# Patient Record
Sex: MALE | Race: WHITE | NOT HISPANIC OR LATINO
[De-identification: names, ages, dates, MRNs, and addresses within clinical notes are randomized per-mention and may not be internally consistent; named-entity substitution may affect disease eponyms.]

---

## 2017-12-01 ENCOUNTER — APPOINTMENT (OUTPATIENT)
Dept: GASTROENTEROLOGY | Facility: CLINIC | Age: 62
End: 2017-12-01
Payer: COMMERCIAL

## 2017-12-01 VITALS — SYSTOLIC BLOOD PRESSURE: 143 MMHG | HEART RATE: 90 BPM | DIASTOLIC BLOOD PRESSURE: 89 MMHG | RESPIRATION RATE: 14 BRPM

## 2017-12-01 VITALS — WEIGHT: 160 LBS | BODY MASS INDEX: 25.11 KG/M2 | HEIGHT: 67 IN

## 2017-12-01 DIAGNOSIS — Z87.39 PERSONAL HISTORY OF OTHER DISEASES OF THE MUSCULOSKELETAL SYSTEM AND CONNECTIVE TISSUE: ICD-10-CM

## 2017-12-01 DIAGNOSIS — Z80.0 FAMILY HISTORY OF MALIGNANT NEOPLASM OF DIGESTIVE ORGANS: ICD-10-CM

## 2017-12-01 DIAGNOSIS — Z86.69 PERSONAL HISTORY OF OTHER DISEASES OF THE NERVOUS SYSTEM AND SENSE ORGANS: ICD-10-CM

## 2017-12-01 DIAGNOSIS — Z86.59 PERSONAL HISTORY OF OTHER MENTAL AND BEHAVIORAL DISORDERS: ICD-10-CM

## 2017-12-01 PROCEDURE — 82270 OCCULT BLOOD FECES: CPT

## 2017-12-01 PROCEDURE — 99203 OFFICE O/P NEW LOW 30 MIN: CPT

## 2017-12-01 RX ORDER — ASPIRIN 81 MG/1
81 TABLET, CHEWABLE ORAL
Refills: 0 | Status: ACTIVE | COMMUNITY

## 2017-12-01 RX ORDER — AZITHROMYCIN 250 MG/1
250 TABLET, FILM COATED ORAL
Qty: 6 | Refills: 0 | Status: DISCONTINUED | COMMUNITY
Start: 2017-07-28

## 2017-12-01 RX ORDER — IBUPROFEN 600 MG/1
600 TABLET, FILM COATED ORAL
Qty: 20 | Refills: 0 | Status: ACTIVE | COMMUNITY
Start: 2017-11-03

## 2017-12-01 RX ORDER — AMOXICILLIN 500 MG/1
500 TABLET, FILM COATED ORAL
Qty: 28 | Refills: 0 | Status: DISCONTINUED | COMMUNITY
Start: 2017-11-03

## 2017-12-01 RX ORDER — PREDNISONE 20 MG/1
20 TABLET ORAL
Qty: 15 | Refills: 0 | Status: DISCONTINUED | COMMUNITY
Start: 2017-08-01

## 2017-12-01 RX ORDER — TRAZODONE HYDROCHLORIDE 100 MG/1
100 TABLET ORAL
Qty: 90 | Refills: 0 | Status: ACTIVE | COMMUNITY
Start: 2017-03-10

## 2017-12-01 RX ORDER — PREDNISONE 5 MG/1
5 TABLET ORAL
Qty: 30 | Refills: 0 | Status: ACTIVE | COMMUNITY
Start: 2017-05-22

## 2017-12-01 RX ORDER — FOLIC ACID 1 MG/1
1 TABLET ORAL
Qty: 30 | Refills: 0 | Status: ACTIVE | COMMUNITY
Start: 2017-05-22

## 2017-12-01 RX ORDER — ASCORBIC ACID 500 MG
TABLET ORAL
Refills: 0 | Status: ACTIVE | COMMUNITY

## 2017-12-01 RX ORDER — ATORVASTATIN CALCIUM 20 MG/1
20 TABLET, FILM COATED ORAL
Qty: 90 | Refills: 0 | Status: ACTIVE | COMMUNITY
Start: 2017-03-10

## 2017-12-01 RX ORDER — AMOXICILLIN 500 MG/1
500 CAPSULE ORAL
Qty: 28 | Refills: 0 | Status: DISCONTINUED | COMMUNITY
Start: 2017-11-04

## 2017-12-01 RX ORDER — ALBUTEROL SULFATE 90 UG/1
108 (90 BASE) AEROSOL, METERED RESPIRATORY (INHALATION)
Qty: 9 | Refills: 0 | Status: DISCONTINUED | COMMUNITY
Start: 2017-08-01

## 2017-12-01 RX ORDER — METHOTREXATE 2.5 MG/1
2.5 TABLET ORAL
Qty: 16 | Refills: 0 | Status: ACTIVE | COMMUNITY
Start: 2017-06-26

## 2018-02-16 ENCOUNTER — OUTPATIENT (OUTPATIENT)
Dept: OUTPATIENT SERVICES | Facility: HOSPITAL | Age: 63
LOS: 1 days | Discharge: HOME | End: 2018-02-16
Payer: COMMERCIAL

## 2018-02-16 ENCOUNTER — APPOINTMENT (OUTPATIENT)
Dept: GASTROENTEROLOGY | Facility: GI CENTER | Age: 63
End: 2018-02-16
Payer: COMMERCIAL

## 2018-02-16 DIAGNOSIS — K64.8 OTHER HEMORRHOIDS: ICD-10-CM

## 2018-02-16 DIAGNOSIS — K64.4 RESIDUAL HEMORRHOIDAL SKIN TAGS: ICD-10-CM

## 2018-02-16 DIAGNOSIS — K57.30 DIVERTICULOSIS OF LARGE INTESTINE W/OUT PERFORATION OR ABSCESS W/OUT BLEEDING: ICD-10-CM

## 2018-02-16 DIAGNOSIS — Z12.11 ENCOUNTER FOR SCREENING FOR MALIGNANT NEOPLASM OF COLON: ICD-10-CM

## 2018-02-16 PROCEDURE — 45378 DIAGNOSTIC COLONOSCOPY: CPT

## 2018-02-16 PROCEDURE — G0121: CPT

## 2018-07-26 PROBLEM — Z80.0 FAMILY HISTORY OF COLON CANCER: Status: INACTIVE | Noted: 2017-12-01

## 2021-12-16 ENCOUNTER — APPOINTMENT (OUTPATIENT)
Dept: THORACIC SURGERY | Facility: CLINIC | Age: 66
End: 2021-12-16
Payer: COMMERCIAL

## 2021-12-16 VITALS
SYSTOLIC BLOOD PRESSURE: 151 MMHG | RESPIRATION RATE: 15 BRPM | HEIGHT: 67 IN | OXYGEN SATURATION: 95 % | BODY MASS INDEX: 25.58 KG/M2 | DIASTOLIC BLOOD PRESSURE: 82 MMHG | HEART RATE: 80 BPM | WEIGHT: 163 LBS | TEMPERATURE: 98.1 F

## 2021-12-16 DIAGNOSIS — Z60.2 PROBLEMS RELATED TO LIVING ALONE: ICD-10-CM

## 2021-12-16 DIAGNOSIS — Z72.89 OTHER PROBLEMS RELATED TO LIFESTYLE: ICD-10-CM

## 2021-12-16 PROCEDURE — 99204 OFFICE O/P NEW MOD 45 MIN: CPT

## 2021-12-16 RX ORDER — CHLORHEXIDINE GLUCONATE, 0.12% ORAL RINSE 1.2 MG/ML
0.12 SOLUTION DENTAL
Qty: 473 | Refills: 0 | Status: COMPLETED | COMMUNITY
Start: 2017-11-03 | End: 2021-12-16

## 2021-12-16 RX ORDER — POLYETHYLENE GLYOCOL 3350, SODIUM CHLORIDE, SODIUM BICARBONATE AND POTASSIUM CHLORIDE 420; 11.2; 5.72; 1.48 G/4L; G/4L; G/4L; G/4L
420 POWDER, FOR SOLUTION NASOGASTRIC; ORAL
Qty: 1 | Refills: 0 | Status: COMPLETED | COMMUNITY
Start: 2017-12-01 | End: 2021-12-16

## 2021-12-16 SDOH — SOCIAL STABILITY - SOCIAL INSECURITY: PROBLEMS RELATED TO LIVING ALONE: Z60.2

## 2021-12-16 NOTE — HISTORY OF PRESENT ILLNESS
[FreeTextEntry1] : Mr. FRYE is a 66 year old male with 50 pack year smoking history and past medical history of Asbestos, emphysema, exposure, Depression, Rheumatic Arthritis on Methotrexate , Hyperlipidemia, Right Lung consolidation/ Bullous changes . He is referred by pulmonologist  for initial evaluation and management for  Lung consolidation/ Bullous changes. Today he reports feeling well and offers no complaints. Denies any unintentional weight loss, chest pain, dizziness, palpitations, shortness of breath, fevers, chills, nausea, vomiting, diarrhea or other related symptoms. \par \par

## 2021-12-16 NOTE — ASSESSMENT
[FreeTextEntry1] : \par \par I had the pleasure of evaluating Mr. Albarran today during our office visit. Most recent CT scan revealed right lung consolidation/bullous changes. I have reviewed the patient's medical records and diagnostic images during the time of this office visit, and I have made the following recommendation. I am recommending a flexible bronchoscopy in order to r/o AFB, fungus. I have discussed the risks and benefits of the procedure in detail. He is in agreement to proceed. He will be scheduled in the next one to two weeks.\par \par \par PLAN:\par -Schedule Flexible Bronchoscopy \par -Requires medical clearance prior to bronchoscopy \par \par \par \par \par \par "I, Herber Morataya, am scribing for and the presence of Dr. Bishop the following sections HISTORY OF PRESENT ILLNESS, PAST MEDICAL/FAMILY/SOCIAL HISTORY; REVIEW OF SYSTEMS; VITAL SIGNS; PHYSICAL EXAM; DISPOSITION."\par \par "I personally performed the services described in the documentation, review the documentation recorded by the scribe in my presence and it accurately and completely records my words and actions."\par \par

## 2021-12-16 NOTE — CONSULT LETTER
[Dear  ___] : Dear  [unfilled], [Courtesy Letter:] : I had the pleasure of seeing your patient, [unfilled], in my office today. [Please see my note below.] : Please see my note below. [Consult Closing:] : Thank you very much for allowing me to participate in the care of this patient.  If you have any questions, please do not hesitate to contact me. [Sincerely,] : Sincerely, [FreeTextEntry2] :  [FreeTextEntry3] : Eduin Bishop MD\par Director of Thoracic, [facility]\par Cardiovascular & Thoracic Surgery\par [address] [place] [address] \[place] [address]\par Tel: [phone]\par Fax: [phone]\par \par

## 2021-12-16 NOTE — REVIEW OF SYSTEMS
[Feeling Poorly] : not feeling poorly [Feeling Tired] : not feeling tired [Eyesight Problems] : no eyesight problems [Discharge From Eyes] : no purulent discharge from the eyes [Nosebleeds] : no nosebleeds [Nasal Discharge] : no nasal discharge [Chest Pain] : no chest pain [Palpitations] : no palpitations [Cough] : cough [SOB on Exertion] : no shortness of breath during exertion [Constipation] : no constipation [Diarrhea] : no diarrhea [Hesitancy] : no urinary hesitancy [Nocturia] : no nocturia [Limb Pain] : no limb pain [Limb Swelling] : no limb swelling [Itching] : no itching [Change In A Mole] : no change in a mole [Anxiety] : no anxiety [Depression] : no depression [Muscle Weakness] : no muscle weakness [Erectile Dysfunction] : no erectile dysfunction [Swollen Glands] : no swollen glands [Swollen Glands In The Neck] : no swollen glands in the neck

## 2021-12-16 NOTE — DATA REVIEWED
[FreeTextEntry1] : 9/24/21 Non contrast CT chest revealed Moderate emphysematous changes. \par \par Subpleural areas of heterogeneous parenchymal density with mixed areas of consolidation and underlying bullous changes. One focal area of consolidative appearance and nodularity along the superior margin appears more prominent since the recent CT of 6/18/2021. Given the persistence of findings since the earlier examination of 12/4/2020, differential diagnosis includes mycobacterial or other fungal infections including aspergillosis. Underlying neoplastic process cannot be excluded. Clinical evaluation and follow-up is recommended. \par \par 5/1/21 PET scan revealed There is intense FDG activity corresponding with consolidative changes along the lateral periphery of the right lung apex. Findings may represent an infectious/inflammatory process. Malignancy is less likely although cannot be excluded. Continued follow-up with a dedicated, noncontrast CT scan of the chest in 3-6 months is recommended for further evaluation if biopsy is deferred at\par this time.There is intense FDG activity at the left wrist. MRI correlation is recommended for further evaluation.\par There are FDG avid left axillary lymph nodes which may be reactive in nature. Malignancy is less likely although cannot be excluded. \par

## 2021-12-16 NOTE — PHYSICAL EXAM
[General Appearance - Alert] : alert [General Appearance - Well Nourished] : well nourished [Sclera] : the sclera and conjunctiva were normal [Extraocular Movements] : extraocular movements were intact [Outer Ear] : the ears and nose were normal in appearance [Hearing Threshold Finger Rub Not [place]] : hearing was normal [Neck Appearance] : the appearance of the neck was normal [] : no respiratory distress [Exaggerated Use Of Accessory Muscles For Inspiration] : no accessory muscle use [Apical Impulse] : the apical impulse was normal [Examination Of The Chest] : the chest was normal in appearance [2+] : left 2+ [Bowel Sounds] : normal bowel sounds [FreeTextEntry1] : defer [Cervical Lymph Nodes Enlarged Posterior Bilaterally] : posterior cervical [No CVA Tenderness] : no ~M costovertebral angle tenderness [Abnormal Walk] : normal gait [Skin Color & Pigmentation] : normal skin color and pigmentation [Sensation] : the sensory exam was normal to light touch and pinprick [Oriented To Time, Place, And Person] : oriented to person, place, and time

## 2022-01-10 ENCOUNTER — OUTPATIENT (OUTPATIENT)
Dept: OUTPATIENT SERVICES | Facility: HOSPITAL | Age: 67
LOS: 1 days | End: 2022-01-10
Payer: COMMERCIAL

## 2022-01-10 VITALS
TEMPERATURE: 98 F | HEART RATE: 60 BPM | DIASTOLIC BLOOD PRESSURE: 60 MMHG | RESPIRATION RATE: 16 BRPM | HEIGHT: 67 IN | WEIGHT: 165.35 LBS | SYSTOLIC BLOOD PRESSURE: 110 MMHG

## 2022-01-10 DIAGNOSIS — Z87.39 PERSONAL HISTORY OF OTHER DISEASES OF THE MUSCULOSKELETAL SYSTEM AND CONNECTIVE TISSUE: ICD-10-CM

## 2022-01-10 DIAGNOSIS — Z29.9 ENCOUNTER FOR PROPHYLACTIC MEASURES, UNSPECIFIED: ICD-10-CM

## 2022-01-10 DIAGNOSIS — Z98.890 OTHER SPECIFIED POSTPROCEDURAL STATES: Chronic | ICD-10-CM

## 2022-01-10 DIAGNOSIS — R22.2 LOCALIZED SWELLING, MASS AND LUMP, TRUNK: ICD-10-CM

## 2022-01-10 DIAGNOSIS — Z01.818 ENCOUNTER FOR OTHER PREPROCEDURAL EXAMINATION: ICD-10-CM

## 2022-01-10 LAB
ANION GAP SERPL CALC-SCNC: 12 MMOL/L — SIGNIFICANT CHANGE UP (ref 5–17)
BUN SERPL-MCNC: 15.1 MG/DL — SIGNIFICANT CHANGE UP (ref 8–20)
CALCIUM SERPL-MCNC: 10 MG/DL — SIGNIFICANT CHANGE UP (ref 8.6–10.2)
CHLORIDE SERPL-SCNC: 104 MMOL/L — SIGNIFICANT CHANGE UP (ref 98–107)
CO2 SERPL-SCNC: 24 MMOL/L — SIGNIFICANT CHANGE UP (ref 22–29)
CREAT SERPL-MCNC: 0.96 MG/DL — SIGNIFICANT CHANGE UP (ref 0.5–1.3)
GLUCOSE SERPL-MCNC: 108 MG/DL — HIGH (ref 70–99)
HCT VFR BLD CALC: 48.7 % — SIGNIFICANT CHANGE UP (ref 39–50)
HGB BLD-MCNC: 16.3 G/DL — SIGNIFICANT CHANGE UP (ref 13–17)
MCHC RBC-ENTMCNC: 33.5 GM/DL — SIGNIFICANT CHANGE UP (ref 32–36)
MCHC RBC-ENTMCNC: 34 PG — SIGNIFICANT CHANGE UP (ref 27–34)
MCV RBC AUTO: 101.7 FL — HIGH (ref 80–100)
PLATELET # BLD AUTO: 280 K/UL — SIGNIFICANT CHANGE UP (ref 150–400)
POTASSIUM SERPL-MCNC: 4.9 MMOL/L — SIGNIFICANT CHANGE UP (ref 3.5–5.3)
POTASSIUM SERPL-SCNC: 4.9 MMOL/L — SIGNIFICANT CHANGE UP (ref 3.5–5.3)
RBC # BLD: 4.79 M/UL — SIGNIFICANT CHANGE UP (ref 4.2–5.8)
RBC # FLD: 13.2 % — SIGNIFICANT CHANGE UP (ref 10.3–14.5)
SODIUM SERPL-SCNC: 140 MMOL/L — SIGNIFICANT CHANGE UP (ref 135–145)
WBC # BLD: 7.48 K/UL — SIGNIFICANT CHANGE UP (ref 3.8–10.5)
WBC # FLD AUTO: 7.48 K/UL — SIGNIFICANT CHANGE UP (ref 3.8–10.5)

## 2022-01-10 PROCEDURE — 80048 BASIC METABOLIC PNL TOTAL CA: CPT

## 2022-01-10 PROCEDURE — 93010 ELECTROCARDIOGRAM REPORT: CPT

## 2022-01-10 PROCEDURE — 85027 COMPLETE CBC AUTOMATED: CPT

## 2022-01-10 PROCEDURE — 93005 ELECTROCARDIOGRAM TRACING: CPT

## 2022-01-10 PROCEDURE — G0463: CPT

## 2022-01-10 PROCEDURE — 36415 COLL VENOUS BLD VENIPUNCTURE: CPT

## 2022-01-10 RX ORDER — ASPIRIN/CALCIUM CARB/MAGNESIUM 324 MG
0 TABLET ORAL
Qty: 0 | Refills: 0 | DISCHARGE

## 2022-01-10 RX ORDER — METHOTREXATE 2.5 MG/1
6 TABLET ORAL
Qty: 0 | Refills: 0 | DISCHARGE

## 2022-01-10 RX ORDER — ATORVASTATIN CALCIUM 80 MG/1
1 TABLET, FILM COATED ORAL
Qty: 0 | Refills: 0 | DISCHARGE

## 2022-01-10 RX ORDER — TRAZODONE HCL 50 MG
1 TABLET ORAL
Qty: 0 | Refills: 0 | DISCHARGE

## 2022-01-10 RX ORDER — FOLIC ACID 0.8 MG
1 TABLET ORAL
Qty: 0 | Refills: 0 | DISCHARGE

## 2022-01-10 NOTE — H&P PST ADULT - PROBLEM SELECTOR PLAN 3
Flexible Bronchoscopy by Dr. Bishop on 1/19/22. Covid vaccine series completed, card in chart,(Pfizer X 3 10/29/21)  covid test is on 1/16/22. Medical Clearance pending

## 2022-01-10 NOTE — ASU PATIENT PROFILE, ADULT - MEDICATIONS BROUGHT TO HOSPITAL, PROFILE
Scott Tran arrives from lab  Labs reviewed and hg is 7.7   Platelet count is 119  No transfusion needed   Discharged per ambulatory
no

## 2022-01-10 NOTE — ASU PATIENT PROFILE, ADULT - FALL HARM RISK - UNIVERSAL INTERVENTIONS
Bed in lowest position, wheels locked, appropriate side rails in place/Call bell, personal items and telephone in reach/Instruct patient to call for assistance before getting out of bed or chair/Non-slip footwear when patient is out of bed/[place] to call system/Physically safe environment - no spills, clutter or unnecessary equipment/Purposeful Proactive Rounding/Room/bathroom lighting operational, light cord in reach

## 2022-01-10 NOTE — H&P PST ADULT - FALL HARM RISK - UNIVERSAL INTERVENTIONS
Bed in lowest position, wheels locked, appropriate side rails in place/Call bell, personal items and telephone in reach/Instruct patient to call for assistance before getting out of bed or chair/Non-slip footwear when patient is out of bed/[place] to call system/Physically safe environment - no spills, clutter or unnecessary equipment/Purposeful Proactive Rounding/Room/bathroom lighting operational, light cord in reach

## 2022-01-10 NOTE — H&P PST ADULT - ASSESSMENT
66 year old male who states that he has lung Nodules, now scheduled for a Flexible Bronchoscopy by Dr. Bishop on 1/19/22. Covid vaccine series completed, card in chart,(Pfizer X 3 10/29/21)  covid test is on 1/16/22. Medical Clearance pending  66 year old male who states that he has lung Nodules, now scheduled for a Flexible Bronchoscopy by Dr. Bishop on 22. Covid vaccine series completed, card in chart,(Pfizer X 3 10/29/21)  covid test is on 22. Medical Clearance pending     medications reviewed, instructions given on what medications to take and what not to take. Asked the patient to consult with PCPabout holding ASA  and stop/Hold it accordingly, failure to do so may result in cancellation or postponement of your surgery,pt  agreed and verbalized understanding. Stop Aleve and MVI one week prior. All other meds can be continued till the night before surgery. pt is not taking Plavix OR Anticoagulation medication at this time.     CAPRINI VTE 2.0 SCORE [CLOT updated 2019]    AGE RELATED RISK FACTORS                                                       MOBILITY RELATED FACTORS  [ ] Age 41-60 years                                            (1 Point)                    [ ] Bed rest                                                        (1 Point)  [x ] Age: 61-74 years                                           (2 Points)                  [ ] Plaster cast                                                   (2 Points)  [ ] Age= 75 years                                              (3 Points)                    [ ] Bed bound for more than 72 hours                 (2 Points)    DISEASE RELATED RISK FACTORS                                               GENDER SPECIFIC FACTORS  [ ] Edema in the lower extremities                       (1 Point)              [ ] Pregnancy                                                     (1 Point)  [ ] Varicose veins                                               (1 Point)                     [ ] Post-partum < 6 weeks                                   (1 Point)             [x ] BMI > 25 Kg/m2                                            (1 Point)                     [ ] Hormonal therapy  or oral contraception          (1 Point)                 [ ] Sepsis (in the previous month)                        (1 Point)               [ ] History of pregnancy complications                 (1 point)  [ ] Pneumonia or serious lung disease                                               [ ] Unexplained or recurrent                     (1 Point)           (in the previous month)                               (1 Point)  [ ] Abnormal pulmonary function test                     (1 Point)                 SURGERY RELATED RISK FACTORS  [ ] Acute myocardial infarction                              (1 Point)               [ ]  Section                                             (1 Point)  [ ] Congestive heart failure (in the previous month)  (1 Point)      [ x] Minor surgery                                                  (1 Point)   [ ] Inflammatory bowel disease                             (1 Point)               [ ] Arthroscopic surgery                                        (2 Points)  [ ] Central venous access                                      (2 Points)                [ ] General surgery lasting more than 45 minutes (2 points)  [ ] Malignancy- Present or previous                   (2 Points)                [ ] Elective arthroplasty                                         (5 points)    [ ] Stroke (in the previous month)                          (5 Points)                                                                                                                                                           HEMATOLOGY RELATED FACTORS                                                 TRAUMA RELATED RISK FACTORS  [ ] Prior episodes of VTE                                     (3 Points)                [ ] Fracture of the hip, pelvis, or leg                       (5 Points)  [ ] Positive family history for VTE                         (3 Points)             [ ] Acute spinal cord injury (in the previous month)  (5 Points)  [ ] Prothrombin 52925 A                                     (3 Points)               [ ] Paralysis  (less than 1 month)                             (5 Points)  [ ] Factor V Leiden                                             (3 Points)                  [ ] Multiple Trauma within 1 month                        (5 Points)  [ ] Lupus anticoagulants                                     (3 Points)                                                           [ ] Anticardiolipin antibodies                               (3 Points)                                                       [ ] High homocysteine in the blood                      (3 Points)                                             [ ] Other congenital or acquired thrombophilia      (3 Points)                                                [ ] Heparin induced thrombocytopenia                  (3 Points)                                     Total Score [     4     ]    OPIOID RISK TOOL    RODRIGO EACH BOX THAT APPLIES AND ADD TOTALS AT THE END    FAMILY HISTORY OF SUBSTANCE ABUSE                 FEMALE         MALE                                                Alcohol                             [  ]1 pt          [  ]3pts                                               Illegal Drugs                     [  ]2 pts        [  ]3pts                                               Rx Drugs                           [  ]4 pts        [  ]4 pts    PERSONAL HISTORY OF SUBSTANCE ABUSE                                                                                          Alcohol                             [  ]3 pts       [  ]3 pts                                               Illegal Drugs                     [  ]4 pts        [  ]4 pts                                               Rx Drugs                           [  ]5 pts        [  ]5 pts    AGE BETWEEN 16-45 YEARS                                      [  ]1 pt         [  ]1 pt    HISTORY OF PREADOLESCENT   SEXUAL ABUSE                                                             [  ]3 pts        [  ]0pts    PSYCHOLOGICAL DISEASE                     ADD, OCD, Bipolar, Schizophrenia        [  ]2 pts         [  ]2 pts                      Depression                                               [  ]1 pt           [  ]1 pt           SCORING TOTAL   (add numbers and type here)              ( 0)                                     A score of 3 or lower indicated LOW risk for future opioid abuse  A score of 4 to 7 indicated moderate risk for future opioid abuse  A score of 8 or higher indicates a high risk for opioid abuse

## 2022-01-10 NOTE — H&P PST ADULT - HISTORY OF PRESENT ILLNESS
66 year old male with 50 pack year smoking history and past medical history of Asbestos, emphysema, exposure, Depression, Rheumatic Arthritis on Methotrexate , Hyperlipidemia, Right Lung consolidation/ Bullous changes who is here PST states that he has lung Nodules, now scheduled for a Flexible Bronchoscopy by Dr. Bishop on 1/19/22. Covid vaccine series completed, card in chart,(Pfizer X 3 10/29/21)  covid test is on 1/16/22. Medical Clearance pending. Today he reports feeling well and offers no complaints. Denies any unintentional weight loss, chest pain, dizziness, palpitations, shortness of breath, fevers, chills, nausea, vomiting, diarrhea or other related symptoms.

## 2022-02-01 ENCOUNTER — TRANSCRIPTION ENCOUNTER (OUTPATIENT)
Age: 67
End: 2022-02-01

## 2022-02-02 ENCOUNTER — RESULT REVIEW (OUTPATIENT)
Age: 67
End: 2022-02-02

## 2022-02-02 ENCOUNTER — APPOINTMENT (OUTPATIENT)
Dept: THORACIC SURGERY | Facility: HOSPITAL | Age: 67
End: 2022-02-02

## 2022-02-02 ENCOUNTER — OUTPATIENT (OUTPATIENT)
Dept: OUTPATIENT SERVICES | Facility: HOSPITAL | Age: 67
LOS: 1 days | End: 2022-02-02
Payer: COMMERCIAL

## 2022-02-02 DIAGNOSIS — R22.2 LOCALIZED SWELLING, MASS AND LUMP, TRUNK: ICD-10-CM

## 2022-02-02 DIAGNOSIS — Z98.890 OTHER SPECIFIED POSTPROCEDURAL STATES: Chronic | ICD-10-CM

## 2022-02-02 LAB
GRAM STN FLD: SIGNIFICANT CHANGE UP
GRAM STN FLD: SIGNIFICANT CHANGE UP
SPECIMEN SOURCE: SIGNIFICANT CHANGE UP
SPECIMEN SOURCE: SIGNIFICANT CHANGE UP

## 2022-02-02 PROCEDURE — 31624 DX BRONCHOSCOPE/LAVAGE: CPT

## 2022-02-02 PROCEDURE — 31623 DX BRONCHOSCOPE/BRUSH: CPT

## 2022-02-02 PROCEDURE — 87015 SPECIMEN INFECT AGNT CONCNTJ: CPT

## 2022-02-02 PROCEDURE — 87206 SMEAR FLUORESCENT/ACID STAI: CPT

## 2022-02-02 PROCEDURE — 87102 FUNGUS ISOLATION CULTURE: CPT

## 2022-02-02 PROCEDURE — 87070 CULTURE OTHR SPECIMN AEROBIC: CPT

## 2022-02-02 PROCEDURE — 88305 TISSUE EXAM BY PATHOLOGIST: CPT | Mod: 26

## 2022-02-02 PROCEDURE — 88112 CYTOPATH CELL ENHANCE TECH: CPT | Mod: 26

## 2022-02-02 PROCEDURE — 87116 MYCOBACTERIA CULTURE: CPT

## 2022-02-02 PROCEDURE — 88112 CYTOPATH CELL ENHANCE TECH: CPT

## 2022-02-02 PROCEDURE — 88305 TISSUE EXAM BY PATHOLOGIST: CPT

## 2022-02-02 NOTE — BRIEF OPERATIVE NOTE - NSICDXBRIEFPROCEDURE_GEN_ALL_CORE_FT
PROCEDURES:  Flexible bronchoscopy 02-Feb-2022 08:06:53  Shawn Anderson  Bronchoscopy, with BAL 02-Feb-2022 08:07:01  Shawn Anderson  Bronchial brushing cytology 02-Feb-2022 08:10:26  Shawn Anderson

## 2022-02-03 LAB
NIGHT BLUE STAIN TISS: SIGNIFICANT CHANGE UP
NIGHT BLUE STAIN TISS: SIGNIFICANT CHANGE UP
SPECIMEN SOURCE: SIGNIFICANT CHANGE UP
SPECIMEN SOURCE: SIGNIFICANT CHANGE UP

## 2022-02-04 LAB
CULTURE RESULTS: NO GROWTH — SIGNIFICANT CHANGE UP
CULTURE RESULTS: NO GROWTH — SIGNIFICANT CHANGE UP
SPECIMEN SOURCE: SIGNIFICANT CHANGE UP
SPECIMEN SOURCE: SIGNIFICANT CHANGE UP

## 2022-02-18 PROBLEM — Z87.39 PERSONAL HISTORY OF OTHER DISEASES OF THE MUSCULOSKELETAL SYSTEM AND CONNECTIVE TISSUE: Chronic | Status: ACTIVE | Noted: 2022-01-10

## 2022-02-24 ENCOUNTER — APPOINTMENT (OUTPATIENT)
Dept: THORACIC SURGERY | Facility: CLINIC | Age: 67
End: 2022-02-24
Payer: COMMERCIAL

## 2022-02-24 VITALS
RESPIRATION RATE: 16 BRPM | HEART RATE: 80 BPM | SYSTOLIC BLOOD PRESSURE: 148 MMHG | OXYGEN SATURATION: 94 % | DIASTOLIC BLOOD PRESSURE: 78 MMHG

## 2022-02-24 PROCEDURE — 99213 OFFICE O/P EST LOW 20 MIN: CPT

## 2022-02-24 NOTE — PHYSICAL EXAM
[Sclera] : the sclera and conjunctiva were normal [Neck Appearance] : the appearance of the neck was normal [Respiration, Rhythm And Depth] : normal respiratory rhythm and effort [FreeTextEntry1] : coarse breath sounds [Heart Rate And Rhythm] : heart rate was normal and rhythm regular [Examination Of The Chest] : the chest was normal in appearance [2+] : left 2+ [Abnormal Walk] : normal gait [Motor Tone] : muscle strength and tone were normal [Skin Color & Pigmentation] : normal skin color and pigmentation [Sensation] : the sensory exam was normal to light touch and pinprick [Motor Exam] : the motor exam was normal [Oriented To Time, Place, And Person] : oriented to person, place, and time [Impaired Insight] : insight and judgment were intact

## 2022-02-24 NOTE — HISTORY OF PRESENT ILLNESS
[FreeTextEntry1] : Mr. ALBARRAN is a 66 year old male with 50 pack year smoking history and past medical history of Asbestos, emphysema, exposure, Depression, Rheumatic Arthritis on Methotrexate , Hyperlipidemia, Right Lung consolidation/ Bullous changes . He is referred by pulmonologist Dr. Boyer for initial evaluation and management for Lung consolidation/ Bullous changes.\par \par Mr Albarran underwent Flexible Bronchoscopy with endotracheal lavage on 02/02/22 and reports for follow up care.

## 2022-02-24 NOTE — DATA REVIEWED
[FreeTextEntry1] : Collected Date/Time:                   2/2/2022 18:18 EST\par Received Date/Time:                    2/2/2022 18:18 EST\par \par Non-Gynecologic Report - Auth (Verified)\par \par Specimen(s) Submitted\par 1. LUNG, LEFT, MAIN STEM, BRONCHIAL BRUSH\par 2. LUNG, RIGHT, MAIN STEM BRONCHIAL BRUSH\par \par Final Diagnosis\par 1. LUNG, LEFT, MAIN STEM, BRONCHIAL BRUSH\par NEGATIVE FOR MALIGNANT CELLS.\par \par Hypercellular specimen composed of reactive ciliated bronchial cells\par \par \par 2. LUNG, RIGHT, MAIN STEM BRONCHIAL BRUSH\par NEGATIVE FOR MALIGNANT CELLS.\par \par \par \par \par \par \par Collected Date/Time:                   2/2/2022 18:03 EST\par Received Date/Time:                    2/2/2022 18:03 EST\par \par Non-Gynecologic Report - Auth (Verified)\par \par Specimen(s) Submitted\par LUNG, RIGHT, BRONCHOALVEOLAR LAVAGE\par \par Final Diagnosis\par LUNG, RIGHT, BRONCHOALVEOLAR LAVAGE\par NEGATIVE FOR MALIGNANT CELLS.

## 2022-02-24 NOTE — REVIEW OF SYSTEMS
[Feeling Poorly] : not feeling poorly [Feeling Tired] : not feeling tired [Chest Pain] : no chest pain [Palpitations] : no palpitations [Shortness Of Breath] : no shortness of breath [Cough] : no cough [SOB on Exertion] : no shortness of breath during exertion [Negative] : Heme/Lymph

## 2022-02-24 NOTE — ASSESSMENT
[FreeTextEntry1] : Mr Albarran reports to the office today to discuss pathology s/p bronchoscopy. There is no evidence of infection or malignancy noted on bronchoscopy. He is a current cigarette smoker and denies any cough shortness of breath or fatigue. His CT Scan is more concerning than his actual physical exam. He appears overall well and continues to commute and work in [facility] from [place]. He is very active and has no complaints at this time. \par \par PLAN:\par - CT Scan of the chest in June\par - Return to care after imaging\par \par \par \par \par \par \par Madhu LEONARD NP am scribing for and in the presence of Dr. Bishop the following sections HISTORY OF PRESENT ILLNESS, PAST MEDICAL/FAMILY/SOCIAL HISTORY; REVIEW OF SYSTEMS; VITAL SIGNS; PHYSICAL EXAM; DISPOSITION.\par \par "I personally performed the services described in the documentation, reviewed the documentation recorded by the scribe in my presence and accurately and completely records my words and actions."\par

## 2022-03-02 LAB
CULTURE RESULTS: SIGNIFICANT CHANGE UP
SPECIMEN SOURCE: SIGNIFICANT CHANGE UP

## 2022-03-05 LAB
CULTURE RESULTS: SIGNIFICANT CHANGE UP
SPECIMEN SOURCE: SIGNIFICANT CHANGE UP

## 2022-03-23 LAB
CULTURE RESULTS: SIGNIFICANT CHANGE UP
SPECIMEN SOURCE: SIGNIFICANT CHANGE UP

## 2022-03-26 LAB
CULTURE RESULTS: SIGNIFICANT CHANGE UP
SPECIMEN SOURCE: SIGNIFICANT CHANGE UP

## 2022-06-20 PROBLEM — J18.1 CONSOLIDATION LUNG: Status: ACTIVE | Noted: 2021-12-15

## 2022-06-20 PROBLEM — J43.9 BULLOUS EMPHYSEMA: Status: ACTIVE | Noted: 2022-02-22

## 2022-06-20 PROBLEM — Z77.090 HISTORY OF ASBESTOS EXPOSURE: Status: ACTIVE | Noted: 2022-02-22

## 2022-06-23 ENCOUNTER — APPOINTMENT (OUTPATIENT)
Dept: THORACIC SURGERY | Facility: CLINIC | Age: 67
End: 2022-06-23
Payer: COMMERCIAL

## 2022-06-23 VITALS
OXYGEN SATURATION: 85 % | TEMPERATURE: 98 F | DIASTOLIC BLOOD PRESSURE: 88 MMHG | WEIGHT: 165 LBS | RESPIRATION RATE: 18 BRPM | HEART RATE: 110 BPM | BODY MASS INDEX: 25.9 KG/M2 | HEIGHT: 67 IN | SYSTOLIC BLOOD PRESSURE: 151 MMHG

## 2022-06-23 DIAGNOSIS — J18.1 LOBAR PNEUMONIA, UNSPECIFIED ORGANISM: ICD-10-CM

## 2022-06-23 DIAGNOSIS — Z77.090 CONTACT WITH AND (SUSPECTED) EXPOSURE TO ASBESTOS: ICD-10-CM

## 2022-06-23 DIAGNOSIS — J43.9 EMPHYSEMA, UNSPECIFIED: ICD-10-CM

## 2022-06-23 PROCEDURE — 99213 OFFICE O/P EST LOW 20 MIN: CPT

## 2022-06-23 RX ORDER — HYDROXYCHLOROQUINE SULFATE 200 MG/1
200 TABLET, FILM COATED ORAL
Qty: 90 | Refills: 0 | Status: ACTIVE | COMMUNITY
Start: 2022-05-02

## 2022-06-23 NOTE — ASSESSMENT
[FreeTextEntry1] : I have fully reviewed the imaging. The right upper lobe opacity remains stable in comparison to recent prior CT Chest 9/24/21. Small, bilateral nodules remain stable. New small nodule in the left anterior lung base is likely related to mucous plugging. At this time I am recommending a surveillance CT Chest in 1 year. Smoking cessation was discussed today as well. I will see Mr. Albarran back after his CT scan to discuss findings. He will contact my office in the interim if needed.\par \par \par PLAN:\par - CT Chest noncontrast in 1 year\par - Return to care after imaging to review findings\par \par \par I, Dolores Stoll NP, am scribing for and in the presence of Dr. Bishop the following sections HISTORY OF PRESENT ILLNESS, PAST MEDICAL/FAMILY/SOCIAL HISTORY; REVIEW OF SYSTEMS, VITAL SIGNS, PHYSICAL EXAM, ASSESSMENT, PLAN AND DISPOSITION.\par \par  \par \par "I personally performed the services described in the documentation and reviewed the documentation recorded by the scribe in my presence which accurately and completely recorded my words and actions."\par \par  \par \par Dolores Stoll, ANP-BC\par \par Cardiothoracic Surgery\par \par [facility]\par \par [phone]\par

## 2022-06-23 NOTE — DATA REVIEWED
[FreeTextEntry1] : CT Chest non contrast 5/27/22 at [facility]:\par IMPRESSION:\par - Stable right upper lobe opacity as described compared to the recent prior study. Correlate with patient's clinical therapeutic course and follow up in accordance\par - Stable bilateral pulmonary nodules.\par - New small nodule left anterior lung base is felt endobronchial and possibly focus of mucus plugging. Would advise 6 months follow up to assure no developing endobronchial lesion exists.\par - Again evident is an enlarged left axillary lymph node, felt progressed in size over the long term. Again, correlate with patient's clinical history and findings. Ultrasound may be helpful to better detail the lymph node and to serve as a baseline for follow up.\par - Stable dilated aortic root and ascending thoracic aorta.\par \par \par \par \par \par \par \par Collected Date/Time: 2/2/2022 18:18 EST\par Received Date/Time: 2/2/2022 18:18 EST\par \par Specimen(s) Submitted\par 1. LUNG, LEFT, MAIN STEM, BRONCHIAL BRUSH\par 2. LUNG, RIGHT, MAIN STEM BRONCHIAL BRUSH\par \par Final Diagnosis\par 1. LUNG, LEFT, MAIN STEM, BRONCHIAL BRUSH\par NEGATIVE FOR MALIGNANT CELLS.\par \par Hypercellular specimen composed of reactive ciliated bronchial cells\par \par 2. LUNG, RIGHT, MAIN STEM BRONCHIAL BRUSH\par NEGATIVE FOR MALIGNANT CELLS.\par \par \par  Airway patent, Nasal mucosa clear. Mouth with normal mucosa. Throat has no vesicles, no oropharyngeal exudates and uvula is midline.

## 2022-06-23 NOTE — PHYSICAL EXAM
[Sclera] : the sclera and conjunctiva were normal [PERRL With Normal Accommodation] : pupils were equal in size, round, and reactive to light [Neck Appearance] : the appearance of the neck was normal [] : the neck was supple [Respiration, Rhythm And Depth] : normal respiratory rhythm and effort [Auscultation Breath Sounds / Voice Sounds] : lungs were clear to auscultation bilaterally [FreeTextEntry1] : Diminished breath sounds R lung fields [Heart Rate And Rhythm] : heart rate was normal and rhythm regular [Heart Sounds] : normal S1 and S2 [Examination Of The Chest] : the chest was normal in appearance [Abdomen Soft] : soft [Abnormal Walk] : normal gait [Skin Color & Pigmentation] : normal skin color and pigmentation [Skin Turgor] : normal skin turgor [No Focal Deficits] : no focal deficits [Oriented To Time, Place, And Person] : oriented to person, place, and time [Impaired Insight] : insight and judgment were intact [Affect] : the affect was normal

## 2022-06-23 NOTE — CONSULT LETTER
[Dear  ___] : Dear  [unfilled], [Courtesy Letter:] : I had the pleasure of seeing your patient, [unfilled], in my office today. [Please see my note below.] : Please see my note below. [Consult Closing:] : Thank you very much for allowing me to participate in the care of this patient.  If you have any questions, please do not hesitate to contact me. [Sincerely,] : Sincerely, [FreeTextEntry2] : Alec Boyer MD [FreeTextEntry3] : Eduin Bishop MD\par \par Director of Thoracic, [facility]\par \par Cardiovascular & Thoracic Surgery\par \par  Cardiovascular & Thoracic Surgery\par \par [facility] [employer] [facility]\par \par [facility]\par \par [address]\par \[place] [address]\par

## 2022-06-23 NOTE — HISTORY OF PRESENT ILLNESS
[FreeTextEntry1] : Mr. ALBARRAN is a 66 year old male with 50 pack year smoking history and past medical history of Asbestos, emphysema, exposure, Depression, Rheumatic Arthritis on Methotrexate , Hyperlipidemia, Right Lung consolidation/ Bullous changes . He is referred by pulmonologist Dr. Boyer for initial evaluation and management for Lung consolidation/ Bullous changes.\par \par Mr Albarran underwent Flexible Bronchoscopy with endotracheal lavage on 02/02/22. Biopsies demonstrated no evidence of infection or malignancy. He had non contrast CT Chest  on 5/27/22 and returns to review findings.\par \par Today, he reports feeling well overall. He denies any SOB, cough, fevers/chills or unintentional weight loss.

## 2023-05-03 NOTE — ASU PATIENT PROFILE, ADULT - NS TRANSFER PATIENT BELONGINGS
Arterial Line    Pre-sedation assessment completed: 5/3/2023 7:00 AM    Patient reassessed immediately prior to procedure    Patient location during procedure: OR  Start time: 5/3/2023 7:22 AM  Stop Time:5/3/2023 7:25 AM       Line placed for hemodynamic monitoring and MD/Surgeon request.  Performed By   CRNA/CAA: Tone Mcbride CRNA SRNA: Deshawn Alonso SRNA  Preanesthetic Checklist  Completed: patient identified, IV checked, site marked, risks and benefits discussed, surgical consent, monitors and equipment checked, pre-op evaluation and timeout performed  Arterial Line Prep    Sterile Tech: cap, gloves and mask  Prep: ChloraPrep  Patient monitoring: blood pressure monitoring, continuous pulse oximetry and EKG  Arterial Line Procedure   Laterality:right  Location:  radial artery  Catheter size: 20 G   Guidance: ultrasound guided  PROCEDURE NOTE/ULTRASOUND INTERPRETATION.  Using ultrasound guidance the potential vascular sites for insertion of the catheter were visualized to determine the patency of the vessel to be used for vascular access.  After selecting the appropriate site for insertion, the needle was visualized under ultrasound being inserted into the radial artery, followed by ultrasound confirmation of wire and catheter placement. There were no abnormalities seen on ultrasound; an image was taken; and the patient tolerated the procedure with no complications.   Number of attempts: 2  Successful placement: yes   Post Assessment   Dressing Type: wrist guard applied, secured with tape and occlusive dressing applied.   Complications no  Circ/Move/Sens Assessment: normal.   Patient Tolerance: patient tolerated the procedure well with no apparent complications           None

## 2023-07-03 PROBLEM — F17.200 CURRENT EVERY DAY SMOKER: Status: ACTIVE | Noted: 2021-12-16

## 2023-07-06 ENCOUNTER — APPOINTMENT (OUTPATIENT)
Dept: THORACIC SURGERY | Facility: CLINIC | Age: 68
End: 2023-07-06

## 2023-07-06 ENCOUNTER — APPOINTMENT (OUTPATIENT)
Dept: THORACIC SURGERY | Facility: CLINIC | Age: 68
End: 2023-07-06
Payer: MEDICARE

## 2023-07-06 VITALS
RESPIRATION RATE: 18 BRPM | SYSTOLIC BLOOD PRESSURE: 133 MMHG | HEIGHT: 67 IN | DIASTOLIC BLOOD PRESSURE: 86 MMHG | TEMPERATURE: 98 F | HEART RATE: 91 BPM | WEIGHT: 165 LBS | OXYGEN SATURATION: 96 % | BODY MASS INDEX: 25.9 KG/M2

## 2023-07-06 DIAGNOSIS — J98.4 OTHER DISORDERS OF LUNG: ICD-10-CM

## 2023-07-06 DIAGNOSIS — F17.200 NICOTINE DEPENDENCE, UNSPECIFIED, UNCOMPLICATED: ICD-10-CM

## 2023-07-06 PROCEDURE — 99213 OFFICE O/P EST LOW 20 MIN: CPT

## 2023-07-06 NOTE — PHYSICAL EXAM
[] : no respiratory distress [Respiration, Rhythm And Depth] : normal respiratory rhythm and effort [Exaggerated Use Of Accessory Muscles For Inspiration] : no accessory muscle use [Auscultation Breath Sounds / Voice Sounds] : lungs were clear to auscultation bilaterally [Examination Of The Chest] : the chest was normal in appearance [Chest Visual Inspection Thoracic Asymmetry] : no chest asymmetry [Diminished Respiratory Excursion] : normal chest expansion [2+] : left 2+ [Involuntary Movements] : no involuntary movements were seen [No Focal Deficits] : no focal deficits [Oriented To Time, Place, And Person] : oriented to person, place, and time

## 2023-07-07 PROBLEM — J98.4 CAVITARY LESION OF LUNG: Status: ACTIVE | Noted: 2023-07-07

## 2023-07-07 NOTE — HISTORY OF PRESENT ILLNESS
[FreeTextEntry1] : Mr. FRYE is a 68 year old who presents for a follow up appointment. Previously he has been followed for lung consolidation/bullous changes noted on CT chest in 2021. At last office visit in June 2022, we discussed that the right upper lobe opacity remains stable in comparison to prior imaging; he recently had CT imaging performed which he presents for today to review.\par \par Past medical history includes current smoker w/ 50 pack year smoking history, Asbestos exposure, emphysema, Depression, Rheumatic Arthritis on Methotrexate , Hyperlipidemia, Right Lung consolidation/ Bullous changes \par \par INTERVAL Hx: Jan 2023 underwent removal of a cyst from left hand. \par \par Today, patient denies worsening SOB, chest pain, cough, hemoptysis, fever, chills, night sweats, lightheadedness or dizziness.\par \par

## 2023-07-07 NOTE — ASSESSMENT
[FreeTextEntry1] : Mr. FRYE is a 68 year old who presents for a follow up appointment. Previously he has been followed for lung consolidation/bullous changes noted on CT chest in 2021. At last office visit in June 2022, we discussed that the right upper lobe opacity remains stable in comparison to prior imaging; he recently had CT imaging performed which he presents for today to review.\par \par I have independently reviewed the medical records and imaging at the time of this office consultation, and discussed the following interpretations with the patient:\par - CT reviewed; Slight growth in right upper lobe cavitary lesion. Discussed repeating CT Chest in 6 months to re-evaluate. He is agreeable. \par - Smoking cessation discussed. Patient will follow up with PCP for supportive measures when he is ready to quit. \par \par Recommendations reviewed with patient during this office visit, and all questions answered; Patient instructed on the importance of follow up and verbalizes understanding.\par \par I, ALLEN Gómez, personally performed the evaluation and management (E/M) services for this established patient. That E/M includes conducting the examination, assessing all new/exacerbated conditions, and establishing a new plan of care. Today, My ACP, Frida Hill, was here to observe my evaluation and management services for this patient to be followed going forward.\par \par

## 2023-07-07 NOTE — DATA REVIEWED
[FreeTextEntry1] : CT-CHEST NON CONTRAST performed at [facility] on 6/9/23:\par \par HISTORY:\par TECHNIQUE: Noncontrast CT of the chest was performed. Axial, coronal and\par sagittal images were reconstructed using iterative reconstruction technique.\par This study was performed using automatic exposure control (radiation dose\par reduction software) to obtain a diagnostic image quality scan with patient dose\par as low as reasonably achievable. The mA and kV were adjusted according to\par patient size. The administered radiation dose was 1.82 mSv.\par \par Lung nodularity and opacities, follow-up examination. Smoking history is\par provided.\par \par COMPARISON: Prior chest CT examinations, the last of which was performed\par 5/27/2022\par \par LUNGS AND AIRWAYS: Moderate emphysematous changes are again noted. Upper lobe\par predominance is again seen. Subpleural bullous changes in the apices are again\par noted. Biapical scarring is again seen. Again noted is a somewhat thick-walled\par cavitating focus of consolidation laterally in the right upper lobe. This is\par seen to measure 4.0 x 2.4 cm on axial images (series 2, image 20), perhaps\par minimally larger than noted previously. There has been interval development of a\par 2 mm calcific focus within the superior aspect of this density. Subcentimeter\par calcified and noncalcified nodular densities elsewhere in the lungs are again\par seen. The findings are generally unchanged other than a slight increase in size\par with respect to a nodule anteriorly in the left lower lobe, now measuring 7-8 mm\par (series 4, image 289), compared to prior measurement of 6 mm.\par \par PLEURA: There is no evidence of a pleural effusion.\par \par THYROID GLAND: There is no evidence of thyroid gland enlargement.\par \par MEDIASTINUM, RANDALL: Minor mediastinal lymph node prominence is unchanged.\par \par HEART AND VESSELS: Atherosclerotic changes of the thoracic aorta and coronary\par arteries are again noted. There is a stable appearance to aneurysmal dilatation\par of the ascending thoracic aorta with diameter of 4.4 cm. There is stable\par dilatation of the aortic root with a diameter of 4.6 cm.\par \par CHEST WALL/SOFT TISSUES/AXILLAE: Bilateral axillary lymph node prominence is\par again noted. Findings on the right side are essentially unchanged. On the left\par side, there has been a decrease in size with respect to a previously seen\par enlarged level 1 axillary lymph node, now measuring 1.6 x 1.3 cm (series 3,\par image 15), compared to a prior measurement of 2.4 x 1.6 cm.\par \par BONES: There is no evidence of an aggressive osseous lesion.\par \par \par UPPER ABDOMEN (partially imaged): There is partial visualization of previously\par seen right renal cysts.\par \par \par \par IMPRESSION:\par Emphysema. Moderate emphysematous changes are again noted. The appearance is\par unchanged.\par \par Right upper lobe cavitating process. There is a thick-walled cavitating process\par laterally right upper lobe, perhaps minimally larger than noted on the prior\par examination. Cavitating focus of consolidation is suggested. Neoplastic\par involvement cannot be excluded but would appear to be less likely.\par \par Bilateral lung nodules. Subcentimeter nodules elsewhere within the lungs are\par essentially unchanged, other than a minimal increase in size with respect to a\par left lower lobe nodule, now measuring 7-8 mm compared to prior measurement of 6\par mm. Continued follow-up is suggested.\par \par Thoracic aortic aneurysm. Atherosclerotic changes are again noted. There is a\par stable appearance to aneurysmal dilatation of the ascending thoracic aorta and a\par stable appearance to aortic root dilatation.\par \par Bilateral axillary lymph node enlargement. There has been a decrease in size\par with respect to an enlarged level 1 left axillary lymph node and a stable\par appearance to less prominent right axillary lymph node prominence.\par \par Multiple lung nodules (Chest > Lungs) - R91.8\par Pulmonary opacity - pneumonia vs other (Chest > Lungs) - J18.8\par Emphysema (Chest > Lungs) - J43.8\par Thoracic aortic aneurysm without rupture (Chest > Vessels) - I71.2\par \par \par Signed by: John Bedolla MD\par Signed Date: 6/9/2023 11:02 AM EDT\par SIGNED BY: John Bedolla M.D., Ext. [phone] 06/09/2023 11:02 AM\par \par \par \par \par \par \par \par \par \par \par \par CT-CHEST NON CONTRAST performed at [facility] on 5/27/22:\par \par History: R91.1 Pulmonary Nodule\par Contiguous axial CT scan imaging of the thorax was obtained without the\par introduction of intravenous contrast material. Coronal and sagittal images were\par reconstructed. This study was performed using automatic exposure control and an\par iterative reconstruction technique (radiation dose reduction software) to obtain\par a diagnostic image quality scan with patient dose as low as reasonably\par achievable. The mA and kV were adjusted according to patient size. The\par administered radiation dose was 1.568 mSv.\par \par COMPARISON: Multiple prior chest CT studies the most recent 9/24/2021 and most\par remote 11/4/2016\par \par LUNGS: Redemonstrated diffuse moderate centrilobular emphysematous changes again\par with biapical bullous disease. There is again patchy biapical lung scarring.\par Peripheral parenchymal opacity in the lateral right apex extending inferiorly\par along the lateral right upper lobe with underlying cystic airspace disease is\par felt essentially stable compared to the recent prior and has been present since\par 12/4/2020 with varying degrees of opacification suggesting a waxing and waning\par focal element of infiltration. Correlate with patient's clinical findings and\par therapeutic course. There there are again scattered throughout both lungs small\par subcentimeter pulmonary nodules most best appreciated on the MIP sequence again\par the most conspicuous on the right within the right middle lobe laterally\par measuring 5 mm (image 189 series 4). Small nodular opacity in the anterior left\par lung base now present measuring approximately 6 mm (image 276 series 4) which is\par felt related to focally impacted airway. The lung parenchyma is otherwise\par unremarkable.\par \par PLEURA: There are no pleural effusions. There are no pleural plaques or other\par pleural pathology.\par \par TRACHEOBRONCHIAL TREE: appears unremarkable.\par \par LYMPH NODES/MEDIASTINUM: There again prominent bilateral axillary lymph nodes\par with a dominant lymph node left axilla lateral margin the pectoralis muscle\par measuring 2.4 x 1.6 cm. This is felt essentially stable when allowing for\par interobserver variability compared to the recent prior but subtly progressed\par over the long-term. By way of example 12/4/2020 and this is felt to measure 1.6\par x 1.4 cm.\par \par HEART AND GREAT VESSELS: Grossly there is again patchy mild calcification aorta\par and coronary vasculature. The aortic root and ascending thoracic aorta again\par dilated. The aortic root at the sinus Valsalva is limited for assessment felt to\par measure approximately 4.6 cm in maximal diameter and the ascending thoracic\par aorta 4.4 cm, both which are stable .\par \par VISUALIZED THYROID: Grossly appears normal and symmetrical\par \par VISUALIZED INTRA-ABDOMINAL CONTENTS: Grossly are unremarkable\par \par BONY AND SOFT TISSUE STRUCTURES: There again moderate degenerative changes axial\par spine exaggerating thoracic kyphosis.\par \par The remainder of the study is unremarkable.\par \par IMPRESSION:\par \par \par Stable right upper lobe opacity as described above compared to the recent prior\par study. Correlate with patient's clinical therapeutic course and follow-up in\par accordance to such.\par \par Stable small bilateral pulmonary nodules.\par \par New small nodule left anterior lung base is felt endobronchial and possibly\par focus of mucus plugging. Would however advise 6 month follow-up to assure no\par developing endobronchial lesion exists.\par \par Again evident is an enlarged left axillary lymph node, felt progressed in size\par over the long-term. Again correlate with patient's clinical history history and\par findings. Ultrasound may be helpful to better detail this lymph node and to\par serve as a baseline for follow-up.\par \par Stable dilated aortic root and ascending thoracic aorta.\par \par Stable exam otherwise\par \par Signed by: Manuel Rey MD\par Signed Date: 5/31/2022 10:53 AM EDT\par SIGNED BY: Manuel Rey M.D., Ext. [id number] 05/31/2022 10:53 AM\par \par \par \par \par \par \par \par \par \par \par CT-CHEST NON CONTRAST performed at [facility] on 9/24/21:\par \par History: R91.8 Abnormal finding lung field\par \par CT of the chest was performed with multislice axial images acquired with\par reconstructions performed in axial, sagittal and coronal planes. This study\par was performed using automatic exposure control and an iterative reconstruction\par technique (radiation dose reduction software) to obtain a diagnostic image\par quality scan with patient dose as low as reasonably achievable. The mA and kV\par were adjusted according to patient size. The administered radiation dose was\par 1.932 mSv.\par \par Coronal and sagittal reformatted images were reviewed.\par \par Comparison: Prior CT of chest dated 6/18/2021 and additional earlier\par examinations.\par \par Findings:\par \par LYMPH NODES: There is an enlarged left axillary lymph node measuring 2.1 x 1.4\par cm demonstrating a lobular contour. This is not significantly changed the\par earlier CT of 9/20/2018. There are no enlarged mediastinal lymph nodes.\par Scattered nonspecific lymph nodes appear not significant changed. Vaginal hilar\par structures is limited by absence of intravenous contrast. There is no definite\par hilar lymphadenopathy.\par THYROID: The visualized thyroid gland appears unremarkable.\par \par VASCULAR STRUCTURES AND HEART : The heart is of normal size. The ascending\par thoracic aorta measures 4.1 cm in transverse diameter, minimally dilated without\par significant interval change. The aortic root at the level of sinus of Valsalva\par is dilated measuring approximately 4.5 cm in transverse diameter. Evaluation is\par however, limited on this noncontrast examination. There is no significant\par interval change in size. There is no pericardial effusion.\par \par PLEURA: There is no pleural effusion.\par \par BRONCHI: The central bronchi are patent.\par \par LUNGS: Evaluation of the lung parenchyma demonstrates moderate emphysematous\par changes with an upper lobe predominance. There is a stable right apical\par scarring. Subpleural areas of parenchymal consolidation associated with bullous\par changes within the area of consolidation is again noted. There has been mild\par progression of a focal area of consolidation in the right upper lobe laterally\par on series 4 image 76 versus the prior CT of 6/18/2021. However, greater area of\par consolidation was noted in this region on the earlier CT of 1/29/2021. Findings\par are new since the earlier CT of 9/20/2018. Given the persistence and slight\par worsening compared to prior CT of 6/18/2021 differential diagnosis includes\par possible fungal infections or underlying neoplastic process. Clinical evaluation\par and follow-up is suggested.\par \par Mild linear atelectatic changes are noted in the lower lobes posteriorly. Small\par calcified granulomas bilaterally likely sequela of prior inflammatory process.\par Stable left apical scarring is also noted. Stable small nodular densities are\par noted in the left lung measuring up to 4 mm.\par \par UPPER ABDOMEN: Limited evaluation of the upper abdomen demonstrates no adrenal\par masses.\par \par BONES: There are no aggressive osseous lesions. Progressive healing of\par left-sided rib fractures are noted.\par \par IMPRESSION:\par \par \par Moderate emphysematous changes. J43.8\par \par Subpleural areas of heterogeneous parenchymal density with mixed areas of\par consolidation and underlying bullous changes. One focal area of consolidative\par appearance and nodularity along the superior margin appears more prominent since\par the recent CT of 6/18/2021. Given the persistence of findings since the earlier\par examination of 12/4/2020, differential diagnosis includes mycobacterial or other\par fungal infections including aspergillosis. Underlying neoplastic process cannot\par be excluded. Clinical evaluation and follow-up is recommended. J18.8\par \par \par Signed by: Eloina Archer MD\par Signed Date: 9/24/2021 3:38 PM EDT\par SIGNED BY: Eloina Archer M.D., Ext. [phone] 09/24/2021 03:38 PM

## 2024-01-04 ENCOUNTER — APPOINTMENT (OUTPATIENT)
Dept: THORACIC SURGERY | Facility: CLINIC | Age: 69
End: 2024-01-04
Payer: MEDICARE

## 2024-01-04 VITALS
OXYGEN SATURATION: 93 % | SYSTOLIC BLOOD PRESSURE: 135 MMHG | HEIGHT: 67 IN | WEIGHT: 168 LBS | HEART RATE: 82 BPM | TEMPERATURE: 98.1 F | RESPIRATION RATE: 18 BRPM | BODY MASS INDEX: 26.37 KG/M2 | DIASTOLIC BLOOD PRESSURE: 86 MMHG

## 2024-01-04 DIAGNOSIS — J43.9 EMPHYSEMA, UNSPECIFIED: ICD-10-CM

## 2024-01-04 DIAGNOSIS — R91.8 OTHER NONSPECIFIC ABNORMAL FINDING OF LUNG FIELD: ICD-10-CM

## 2024-01-04 PROCEDURE — 99214 OFFICE O/P EST MOD 30 MIN: CPT

## 2024-01-04 NOTE — ASSESSMENT
[FreeTextEntry1] : I had the pleasure of evaluating Mr. STANISLAW FRYE today. Briefly, this is a 68 year old male who has been found to have a lung nodule. We have been following him for some time and at this time the CT scan remains stable.   After reviewing the imaging, it was determined that there are nodules present in the lung. We discussed that nodules can be of infectious, inflammatory or malignant etiologies. When evaluating suspicious nodules, we look at certain characteristics of nodules such as size, consistency, shape and rate of growth.   On exam he was noted to have an irregular heart rate. He does not have a history of atrial fibrillation and is not on anticoagulation. He was recommended to go to urgent care, which he deferred and will go to his cardiologist next week.   Moving forward our plan is to obtain a follow up surveillance CT chest in 6 months and at that time Mr. FRYE will return to care in my office to discuss the findings. All questions were answered, concerns were addressed and the patient expressed understanding and compliance with the follow-up plan.   Summary of plan: - CT chest in 6 months - Return to care after testing to review results   I, Dr. Eduin Bishop, personally performed the evaluation and management (E/M) services for this new patient.  That E/M includes conducting the initial examination, assessing all conditions, and establishing the plan of care.  Today, Willian Stockton PA-C, was here to observe my evaluation and management services for this patient to be followed going forward.

## 2024-01-04 NOTE — HISTORY OF PRESENT ILLNESS
[FreeTextEntry1] : Mr. Albarran is a 68 year old male who presents for follow up surveillance CT scan. During previous visit CT scan showed slight growth in right upper lobe cavitary lesion. Most recent CT scan performed on 12/11/2023 showed stable appearance to the previously described cavitating focus of consolidation in the right upper lobe, which we will discuss today.  Additionally, he has a past medical history notable for current smoker w/ 50 pack year smoking history, Asbestos exposure, emphysema, Depression, Rheumatic Arthritis on Methotrexate , Hyperlipidemia  Today he reports feeling well. Patient denies chest pain, palpitations, shortness of breath, cough, fevers, chills, fatigue and unintentional weight loss or gain.

## 2024-01-04 NOTE — PHYSICAL EXAM
[Sclera] : the sclera and conjunctiva were normal [Neck Appearance] : the appearance of the neck was normal [Respiration, Rhythm And Depth] : normal respiratory rhythm and effort [Auscultation Breath Sounds / Voice Sounds] : lungs were clear to auscultation bilaterally [Heart Sounds] : normal S1 and S2 [2+] : left 2+ [Bowel Sounds] : normal bowel sounds [Abdomen Soft] : soft [Cervical Lymph Nodes Enlarged Posterior Bilaterally] : posterior cervical [Cervical Lymph Nodes Enlarged Anterior Bilaterally] : anterior cervical [Supraclavicular Lymph Nodes Enlarged Bilaterally] : supraclavicular [Abnormal Walk] : normal gait [Skin Color & Pigmentation] : normal skin color and pigmentation [Skin Turgor] : normal skin turgor [No Focal Deficits] : no focal deficits [Oriented To Time, Place, And Person] : oriented to person, place, and time [Impaired Insight] : insight and judgment were intact [Affect] : the affect was normal [Mood] : the mood was normal [FreeTextEntry1] : irregularly irregular heart rate [FreeTextEntry2] : no edema

## 2024-01-04 NOTE — DATA REVIEWED
[FreeTextEntry1] : CT scan performed on 12/11/2023 at [facility] showed. Emphysema. moderate emphysematous changes are again noted. stable appearance to the previously described cavitating focus on consolidation in the right upper lobe. bilateral lung nodularity. There is generally stable appearance to previously seen nodules other than a decrease in size with respect to a previously seen left lower lobe nodule now measuring 4 mm compared to a prior measurement of 8 mm.  Aortic root dilatation at 4.6 cm. there is a stable apperance to less pronounced dilatation of the aortic arch and descending thoracic aorta.

## 2024-07-11 ENCOUNTER — APPOINTMENT (OUTPATIENT)
Dept: THORACIC SURGERY | Facility: CLINIC | Age: 69
End: 2024-07-11
Payer: MEDICARE

## 2024-07-11 DIAGNOSIS — J43.9 EMPHYSEMA, UNSPECIFIED: ICD-10-CM

## 2024-07-11 DIAGNOSIS — R91.8 OTHER NONSPECIFIC ABNORMAL FINDING OF LUNG FIELD: ICD-10-CM

## 2024-07-11 DIAGNOSIS — F17.200 NICOTINE DEPENDENCE, UNSPECIFIED, UNCOMPLICATED: ICD-10-CM

## 2024-07-11 DIAGNOSIS — J18.1 LOBAR PNEUMONIA, UNSPECIFIED ORGANISM: ICD-10-CM

## 2024-07-11 DIAGNOSIS — J98.4 OTHER DISORDERS OF LUNG: ICD-10-CM

## 2024-07-11 PROCEDURE — 99441: CPT | Mod: 93

## 2024-12-24 PROBLEM — F10.90 ALCOHOL USE: Status: ACTIVE | Noted: 2021-12-16

## 2025-02-09 ENCOUNTER — NON-APPOINTMENT (OUTPATIENT)
Age: 70
End: 2025-02-09

## 2025-04-03 ENCOUNTER — APPOINTMENT (OUTPATIENT)
Dept: THORACIC SURGERY | Facility: CLINIC | Age: 70
End: 2025-04-03
Payer: MEDICARE

## 2025-04-03 DIAGNOSIS — J43.9 EMPHYSEMA, UNSPECIFIED: ICD-10-CM

## 2025-04-03 DIAGNOSIS — J98.4 OTHER DISORDERS OF LUNG: ICD-10-CM

## 2025-04-03 PROCEDURE — 99213 OFFICE O/P EST LOW 20 MIN: CPT | Mod: 93

## (undated) DEVICE — SSH-BRONCHOSCOPE 2043333: Type: DURABLE MEDICAL EQUIPMENT

## (undated) DEVICE — BRUSH CYTO DISP